# Patient Record
Sex: FEMALE | Race: WHITE | URBAN - METROPOLITAN AREA
[De-identification: names, ages, dates, MRNs, and addresses within clinical notes are randomized per-mention and may not be internally consistent; named-entity substitution may affect disease eponyms.]

---

## 2020-04-07 ENCOUNTER — TELEPHONE (OUTPATIENT)
Dept: SURGERY | Age: 60
End: 2020-04-07

## 2020-04-14 NOTE — TELEPHONE ENCOUNTER
feel your cases is urgent, Dr. Guero Quinteros can submit your case to the [address] [place] [address] for review. In addition, if you would like to speak to Dr. Guero Quinteros directly about the details of your case, I can schedule a phone visit for you. We are very much hoping we can start scheduling surgical cases again at the beginning of May, but we still can not say with certainty when we will be able to schedule your case. Would you like Dr. Guero Quinteros to submit your case to the hospital for review?  no    Would you like to schedule a phone appointment with Dr. Guero Quinteros? no    Do you have any other questions?  no    We will call you again in 2-3 weeks with another update. Please contact us before then if you have any questions or concerns.

## 2020-04-23 ENCOUNTER — TELEPHONE (OUTPATIENT)
Dept: SURGERY | Age: 60
End: 2020-04-23

## 2020-04-24 NOTE — TELEPHONE ENCOUNTER
Pt emailed 5 photos, 2 out of the 5 were the best to view. I have imported them into media and below to show how significantly the site has raised since the initial biopsy from Dr. Cyril Layton. She does not complain of any pain or bleeding    I'm routing this to our medical staff and Dr. Combs Both for review for when we being to schedule patients to add her to one of the first group to be scheduled. I notified the patient I would be in touch with her in 2 weeks or sooner as well with an update or to schedule. PHOTOS BELOW BY PT SENT ON 4/23/2020:                    BEFORE PHOTO BY DR. ORTEZ:

## 2020-04-30 NOTE — TELEPHONE ENCOUNTER
Dr. Lianne Pedroza wishes to schedule this patient on 5/14 at 8:30. I have notified the patient and informed her this is tentative until further notice of the pretesting completed before Mohs. She was informed she'd here from me the week of 5/8 to confirm appt for 5/14.

## 2020-05-11 ENCOUNTER — OFFICE VISIT (OUTPATIENT)
Dept: PRIMARY CARE CLINIC | Age: 60
End: 2020-05-11

## 2020-05-13 ENCOUNTER — TELEPHONE (OUTPATIENT)
Dept: SURGERY | Age: 60
End: 2020-05-13

## 2020-05-13 LAB
SARS-COV-2: NOT DETECTED
SOURCE: NORMAL

## 2020-05-14 ENCOUNTER — TELEPHONE (OUTPATIENT)
Dept: SURGERY | Age: 60
End: 2020-05-14

## 2020-05-18 ENCOUNTER — TELEPHONE (OUTPATIENT)
Dept: SURGERY | Age: 60
End: 2020-05-18

## 2020-05-18 ENCOUNTER — PROCEDURE VISIT (OUTPATIENT)
Dept: SURGERY | Age: 60
End: 2020-05-18
Payer: COMMERCIAL

## 2020-05-18 VITALS — TEMPERATURE: 98.5 F

## 2020-05-18 PROBLEM — C44.42 SQUAMOUS CELL CARCINOMA OF SCALP: Status: ACTIVE | Noted: 2020-05-18

## 2020-05-18 PROCEDURE — 13132 CMPLX RPR F/C/C/M/N/AX/G/H/F: CPT | Performed by: DERMATOLOGY

## 2020-05-18 PROCEDURE — 17311 MOHS 1 STAGE H/N/HF/G: CPT | Performed by: DERMATOLOGY

## 2020-05-18 RX ORDER — ASPIRIN 81 MG/1
81 TABLET ORAL DAILY
COMMUNITY

## 2020-05-18 RX ORDER — CEPHALEXIN 500 MG/1
500 CAPSULE ORAL 3 TIMES DAILY
Qty: 15 CAPSULE | Refills: 0 | Status: SHIPPED | OUTPATIENT
Start: 2020-05-18

## 2020-05-18 RX ORDER — ACETAMINOPHEN 160 MG
TABLET,DISINTEGRATING ORAL
COMMUNITY

## 2020-05-18 RX ORDER — SERTRALINE HYDROCHLORIDE 100 MG/1
100 TABLET, FILM COATED ORAL DAILY
COMMUNITY

## 2020-05-18 RX ORDER — LEVOTHYROXINE SODIUM 0.03 MG/1
25 TABLET ORAL DAILY
COMMUNITY

## 2020-05-18 RX ORDER — SIMVASTATIN 10 MG
10 TABLET ORAL NIGHTLY
COMMUNITY

## 2020-05-18 RX ORDER — ESTRADIOL 14 UG/D
1 PATCH TRANSDERMAL WEEKLY
COMMUNITY

## 2020-05-18 NOTE — PROGRESS NOTES
PRE-PROCEDURE SCREENING    Pacemaker/ICD: No  Difficulty with numbing in the past: No  Local Anesthesia Reaction/passing out: No  Latex or adhesive allergy:  No  Bleeding/Clotting Disorders: No  Anticoagulant Therapy: No  Joint prosthesis: No  Artificial Heart Valve: No  Stroke or Seizures: Yes, Seizures, but many years ago  Organ Transplant or Lymphoma: No  Immunosuppression: No  Respiratory Problems: No
normal-appearing skin, using the technique of Mohs. A map was prepared to correspond to the area of skin from which it was excised. Hemostasis was achieved using electrosurgery. The wound was bandaged. The tissue was prepared for the cryostat and sectioned. 1 section(s) prepared. Each section was coded, cut, and stained for microscopic examination. The entire base and margins of the excised piece of tissue were examined by the surgeon. No tumor was identified at the peripheral margins of stage I of microscopically controlled surgery. DEFECT MANAGEMENT:    REPAIR DESCRIPTION:  Various closure modalities were discussed with the patient, and it was decided that a complex repair would best preserve normal anatomic and functional relationships. Additional risk of wound dehiscence was discussed. This is a complex closure based on: Undermining    Extensive undermining was performed: the distance of undermining was 15mm, which is equal to or greater than the maximum width of the defect, measured perpendicular to the closure line along at least one entire edge of the defect. The patient was placed on the procedure room table. The area was anesthetized with 1% lidocaine with epinephrine 1:100,000 buffered, was given a sterile prep using Chlorhexidine gluconate 4% solution, and draped in the usual sterile fashion. Recreation and enlargement of the wound was performed by excising cones of tissue via the triangulation technique. The final incision lines were placed with respect for the patient's natural skin tension lines in a cresentic configuration to avoid functional and aesthetic distortion of adjacent free margins. Following undermining, meticulous hemostasis was obtained with spot monopolar electrocoagulation. Subcutaneous dead space and dermis were closed using 4-0 and 5-0 Vicryl buried subcutaneous interrupted suture and the epidermis was approximated with  liquid skin adhesive.

## 2020-05-18 NOTE — PATIENT INSTRUCTIONS
without peeking. If the bleeding continues you may remove the bandage to locate where the bleeding is coming from and apply pressure for another 20 minutes with gauze and an ice pack. If the bleeding does not stop after you apply pressure and ice pack, call us right away. If you call after hours a call service will transfer you to the physician. If you cannot call or get through to the doctor, go to the nearest emergency room or urgent care facility. What to expect:  You may have these symptoms. They are normal and should get better with time:  1. Swelling. Swelling usually increases for the first 48 hours after your procedure and then begins to improve. Some soreness and redness around your wound. If we worked close to your eyes (forehead, nose, temple, or upper cheeks) your eyes may become swollen and/ or black and blue. 2. Bruising, which could last 1 week or more. 3. Pink and bumpy appearance to the scar. This may happen a few weeks after your procedure. After 4 weeks, you may gently massage the area each day with facial moisturizer or petroleum jelly (Vaseline or Aquaphor). This will help to smooth the skin and improve the appearance of the scar. The color of your scar will fade over time but may be pink for several months after the procedure. The scar may take 6 months to 1 year to reach its final color and appearance. 4. \"Spitting\" suture. Occasionally, an inside suture (stitch) does not completely dissolve. When this happens, (generally 4-8 weeks after surgery), it causes a bump or \"pimple\" to form on the scar. This is easily removed and is not at all serious. It does not mean the skin cancer has returned. Contact us if it happens, but do not be alarmed. Vitamin E oil is NOT necessary. A good moisturizer is just as effective. Sunscreen IS necessary.  Use at least and SPF 30 sunscreen daily- even in winter    Call us at [phone] right away if you have any of the following symptoms:  -Bleeding that you cannot stop (see highlighted area above)   -Pain that lasts longer than 48 hours  -Your wound becomes more painful, red or hot to touch  -Bruising and swelling that does not begin to improve within the 48 hours or gets worse suddenly.

## 2020-05-18 NOTE — TELEPHONE ENCOUNTER
Day of appt, call office at [phone] when you arrive to the parking lot, I will advise you to enter the building. If you have family members they will stay in the car. Please wear a mask when entering the building; our address is noted below. I have everything I need from you for registration. Also the day of the appt, do not stop any medications and eat your breakfast. There are no restrictions the morning of the appointment. I have attached information about the Mohs procedure which is enclosed. Plan on being here a minimum of 3 hours, bring a snack or Lenin Solid with you if you need one while waiting for your Mohs results. Please call me if you have any questions. Patient was asked following questions for screening.      Fever >100.0 F or >99.9 F in immunocompromised patients:  No  New onset of cough:  No  New onset shortness of breath:  No  New onset difficulty breathing:  No  New onset sore throat:  No  New onset of muscle pain:  No  New onset of severe headache:  No  New onset of loss of taste or smell:  No  New onset diarrhea/vomiting:  No      Reviewed office protocol per covid guidelines with patient.

## 2020-05-19 ENCOUNTER — TELEPHONE (OUTPATIENT)
Dept: SURGERY | Age: 60
End: 2020-05-19

## 2020-05-19 NOTE — TELEPHONE ENCOUNTER
The patient was in the office on 5/18/20 for mohs located on the right frontal scalp with CLC repair. The patient tolerated the procedure well and left the office in good condition. Pain level on post-operative day 1:  none    Any bleeding episode that required pressure to be held, bandage change or a call to the office or MD?  no     Any other issues?:  no    A post-operative telephone call was placed at 2:01pm in order to check on the patient's recovery process. The patient reported doing well and had no complaints other than those listed above, if any. All of the patient's questions were answered.